# Patient Record
Sex: MALE | Race: WHITE | URBAN - METROPOLITAN AREA
[De-identification: names, ages, dates, MRNs, and addresses within clinical notes are randomized per-mention and may not be internally consistent; named-entity substitution may affect disease eponyms.]

---

## 2018-11-28 ENCOUNTER — OFFICE VISIT (OUTPATIENT)
Dept: URGENT CARE | Facility: URGENT CARE | Age: 28
End: 2018-11-28
Payer: COMMERCIAL

## 2018-11-28 VITALS
TEMPERATURE: 98 F | HEART RATE: 51 BPM | DIASTOLIC BLOOD PRESSURE: 78 MMHG | SYSTOLIC BLOOD PRESSURE: 110 MMHG | OXYGEN SATURATION: 100 %

## 2018-11-28 DIAGNOSIS — R10.13 POSTPRANDIAL EPIGASTRIC PAIN: Primary | ICD-10-CM

## 2018-11-28 PROCEDURE — 99203 OFFICE O/P NEW LOW 30 MIN: CPT | Performed by: FAMILY MEDICINE

## 2018-11-28 NOTE — MR AVS SNAPSHOT
"              After Visit Summary   11/28/2018    Vasu Carpenter    MRN: [id number]           Patient Information     Date Of Birth          1990        Visit Information        Provider Department      11/28/2018 4:40 PM Alex Mercedes MD [facility]        Today's Diagnoses     Postprandial epigastric pain    -  1      Care Instructions    We will call you with the result of your test      Avoid spicy foods    Zantac 150mg pill 15 minutes before breakfast and 150mg pill 15 minutes before dinner    Take tums as needed for flare ups    If symptoms don't improve in the next few weeks recommend appointment with Gastroenterology          Follow-ups after your visit        Who to contact     If you have questions or need follow up information about today's clinic visit or your schedule please contact [facility] directly at [phone].  Normal or non-critical lab and imaging results will be communicated to you by [employer], letter or phone within 4 business days after the clinic has received the results. If you do not hear from us within 7 days, please contact the clinic through MyChart or phone. If you have a critical or abnormal lab result, we will notify you by phone as soon as possible.  Submit refill requests through [facility] or call your pharmacy and they will forward the refill request to us. Please allow 3 business days for your refill to be completed.          Additional Information About Your Visit        MyChart Information     [facility] lets you send messages to your doctor, view your test results, renew your prescriptions, schedule appointments and more. To sign up, go to [email or web address] . Click on \"Log in\" on the left side of the screen, which will take you to the Welcome page. Then click on \"Sign up Now\" on the right side of the page.     You will be asked to enter the access code listed below, as well as some personal information. Please follow the " directions to create your username and password.     Your access code is: [email or web address]  Expires: 2019  6:06 PM     Your access code will  in 90 days. If you need help or a new code, please call your [place] clinic or [phone].        Care EveryWhere ID     This is your Care EveryWhere ID. This could be used by other organizations to access your [place] medical records  [id number]        Your Vitals Were     Pulse Temperature Pulse Oximetry             51 98  F (36.7  C) (Oral) 100%          Blood Pressure from Last 3 Encounters:   18 110/78   09 110/68   08 106/54    Weight from Last 3 Encounters:   09 144 lb (65.3 kg) (61 %)*   08 138 lb (62.6 kg) (60 %)*   08 133 lb (60.3 kg) (61 %)*     * Growth percentiles are based on [facility] 2-20 Years data.              We Performed the Following     H. Pylori Antibody IGG  (LabDAQ)        Primary Care Provider Office Phone # Fax #    Gregory G Schoen, [phone] [address] [place] [address]        Equal Access to Services     [facility] : Hadii aad ku hadasho Soomaali, waaxda luqadaha, qaybta kaalmada adeegyada, waxay lia george . So [facility] [phone].    ATENCIÓN: Si habla español, tiene a waite disposición servicios gratuitos de asistencia lingüística. [place] al [phone].    We comply with applicable federal civil rights laws and [place] laws. We do not discriminate on the basis of race, color, national origin, age, disability, sex, sexual orientation, or gender identity.            Thank you!     Thank you for choosing [facility]  for your care. Our goal is always to provide you with excellent care. Hearing back from our patients is one way we can continue to improve our services. Please take a few minutes to complete the written survey that you may receive in the mail after your visit with us. Thank you!             Your Updated  Medication List - Protect others around you: Learn how to safely use, store and throw away your medicines at www.disposemymeds.org.          This list is accurate as of 11/28/18  6:07 PM.  Always use your most recent med list.                   Brand Name Dispense Instructions for use Diagnosis    DIFFERIN 0.1 % external cream   Generic drug:  adapalene     45g    apply once daily at bedtime    Other acne

## 2018-11-29 NOTE — PATIENT INSTRUCTIONS
We will call you with the result of your test      Avoid spicy foods    Zantac 150mg pill 15 minutes before breakfast and 150mg pill 15 minutes before dinner    Take tums as needed for flare ups    If symptoms don't improve in the next few weeks recommend appointment with Gastroenterology

## 2018-11-29 NOTE — PROGRESS NOTES
Subjective:   Vasu Carpenter is a 28 year old male who presents for   Chief Complaint   Patient presents with     Urgent Care     Abdominal Pain     Bloating, nausea, pain, pain is prevalent post eating. Sx x3 weeks. Hx of acid reflux   3 week symptoms of stomach discomfort that is upper - worse with eating. Denies any right upper quadrant pain.   Previous hx of heartburn has tried tums. Tried zantac (only used sparingly) recently with some relief. Eats some spicy foods on the occasion.   Denies taking a lot of ibuprofen or NSAIDs. Denies diarrhea or abdominal cramping. Denies dark stools or blood in stool. Has bowel movements at least 1-2x a day - no issues with constipation.     PMH: no recent surgeries  There are no active problems to display for this patient.    Current Outpatient Prescriptions   Medication     DIFFERIN 0.1 % EX CREA     No current facility-administered medications for this visit.      ROS:  As above per HPI    Objective:   /78 (BP Location: Right arm, Patient Position: Chair, Cuff Size: Adult Regular)  Pulse 51  Temp 98  F (36.7  C) (Oral)  SpO2 100%, There is no height or weight on file to calculate BMI.  Gen:  NAD, well-nourished, sitting in chair comfortably  HEENT: EOMI, sclera anicteric, Head normocephalic, ; nares patent; moist mucous membranes  Neck: trachea midline, no thyromegaly  CV:  Hemodynamically stable  Pulm:  no increased work of breathing , CTAB, no wheezes/rales/rhonchi   ABD: soft, non-distended  Extrem: no cyanosis, edema or clubbing  Skin: no obvious rashes or abnormalities  Psych: Euthymic, linear thoughts, normal rate of speech    Assessment & Plan:   Vasu Carpenter, 28 year old male who presents with:  Postprandial epigastric pain  We will recommend Zantac twice daily before breakfast and dinner in addition to using as needed Tums for flareup of symptoms.  Avoid spicy foods.  Will screen for H. pylori given his symptoms ordered serology but apparently is not  available so we will do stool testing.  His symptoms are ongoing especially if worsening should see GI to rule out ulcers.  No evidence of dark stools which would suggest GI bleed at this time.  - H. Pylori Antibody IGG  (LabDAQ)    Alex Mercedes MD   [place] [facility]     Options for treatment and/or follow-up care were reviewed with the patient. Vasu Carpenter and/or legal guardian was engaged and actively involved in the decision making process. Patient/guardian verbalized understanding of the options discussed and was satisfied with the final plan.

## 2018-11-30 DIAGNOSIS — R10.13 POSTPRANDIAL EPIGASTRIC PAIN: ICD-10-CM

## 2018-11-30 PROCEDURE — 87338 HPYLORI STOOL AG IA: CPT | Performed by: FAMILY MEDICINE

## 2018-12-03 LAB
H PYLORI AG STL QL IA: NORMAL
SPECIMEN SOURCE: NORMAL

## 2018-12-04 ENCOUNTER — NURSE TRIAGE (OUTPATIENT)
Dept: NURSING | Facility: CLINIC | Age: 28
End: 2018-12-04

## 2018-12-04 NOTE — TELEPHONE ENCOUNTER
Vasu was left a message to phone back.  FNA relayed message from JAY Pardo about negative H Pylori.

## 2018-12-26 NOTE — TELEPHONE ENCOUNTER
Patient calling and states getting calls and not sure why.  Advised was regarding negative H-Pylori results.  Advised would send message to staff.  Nathalie Ramos RN    Notes recorded by Adele Askew CMA on 12/26/2018 at 2:06 PM CST  Called and lvm to pt for 2nd time to call back clinic.    Adele GalvinAAMA)    ------    Notes Recorded by Adele Askew CMA on 12/4/2018 at 1:48 PM  Called and lvm for pt to call back clinic    [facility])    ------    Notes Recorded by Sindy Morejon PA-C on 12/4/2018 at 1:14 PM  Please notify patient H pylori test is negative.    Thanks!

## 2024-11-15 ENCOUNTER — LAB REQUISITION (OUTPATIENT)
Dept: LAB | Facility: CLINIC | Age: 34
End: 2024-11-15
Payer: COMMERCIAL

## 2024-11-15 DIAGNOSIS — R00.2 PALPITATIONS: ICD-10-CM

## 2024-11-15 LAB
ERYTHROCYTE [DISTWIDTH] IN BLOOD BY AUTOMATED COUNT: 12.5 % (ref 10–15)
HCT VFR BLD AUTO: 43.8 % (ref 40–53)
HGB BLD-MCNC: 15.2 G/DL (ref 13.3–17.7)
MCH RBC QN AUTO: 32.6 PG (ref 26.5–33)
MCHC RBC AUTO-ENTMCNC: 34.7 G/DL (ref 31.5–36.5)
MCV RBC AUTO: 94 FL (ref 78–100)
PLATELET # BLD AUTO: 232 10E3/UL (ref 150–450)
RBC # BLD AUTO: 4.66 10E6/UL (ref 4.4–5.9)
WBC # BLD AUTO: 5 10E3/UL (ref 4–11)

## 2024-11-15 PROCEDURE — 84443 ASSAY THYROID STIM HORMONE: CPT | Mod: ORL | Performed by: PHYSICIAN ASSISTANT

## 2024-11-15 PROCEDURE — 80053 COMPREHEN METABOLIC PANEL: CPT | Mod: ORL | Performed by: PHYSICIAN ASSISTANT

## 2024-11-15 PROCEDURE — 85027 COMPLETE CBC AUTOMATED: CPT | Mod: ORL | Performed by: PHYSICIAN ASSISTANT

## 2024-11-16 LAB
ALBUMIN SERPL BCG-MCNC: 4.7 G/DL (ref 3.5–5.2)
ALP SERPL-CCNC: 71 U/L (ref 40–150)
ALT SERPL W P-5'-P-CCNC: 35 U/L (ref 0–70)
ANION GAP SERPL CALCULATED.3IONS-SCNC: 13 MMOL/L (ref 7–15)
AST SERPL W P-5'-P-CCNC: 31 U/L (ref 0–45)
BILIRUB SERPL-MCNC: 0.7 MG/DL
BUN SERPL-MCNC: 14 MG/DL (ref 6–20)
CALCIUM SERPL-MCNC: 10.1 MG/DL (ref 8.8–10.4)
CHLORIDE SERPL-SCNC: 104 MMOL/L (ref 98–107)
CREAT SERPL-MCNC: 1.07 MG/DL (ref 0.67–1.17)
EGFRCR SERPLBLD CKD-EPI 2021: >90 ML/MIN/1.73M2
GLUCOSE SERPL-MCNC: 92 MG/DL (ref 70–99)
HCO3 SERPL-SCNC: 25 MMOL/L (ref 22–29)
POTASSIUM SERPL-SCNC: 4.6 MMOL/L (ref 3.4–5.3)
PROT SERPL-MCNC: 7.1 G/DL (ref 6.4–8.3)
SODIUM SERPL-SCNC: 142 MMOL/L (ref 135–145)
TSH SERPL DL<=0.005 MIU/L-ACNC: 1.14 UIU/ML (ref 0.3–4.2)

## 2025-06-10 ENCOUNTER — LAB REQUISITION (OUTPATIENT)
Dept: LAB | Facility: CLINIC | Age: 35
End: 2025-06-10
Payer: COMMERCIAL

## 2025-06-10 DIAGNOSIS — N53.12 PAINFUL EJACULATION: ICD-10-CM

## 2025-06-10 PROCEDURE — 87491 CHLMYD TRACH DNA AMP PROBE: CPT | Mod: ORL | Performed by: PHYSICIAN ASSISTANT

## 2025-06-11 LAB
C TRACH DNA SPEC QL PROBE+SIG AMP: NEGATIVE
N GONORRHOEA DNA SPEC QL NAA+PROBE: NEGATIVE
SPECIMEN TYPE: NORMAL